# Patient Record
Sex: FEMALE | Race: WHITE | NOT HISPANIC OR LATINO | URBAN - METROPOLITAN AREA
[De-identification: names, ages, dates, MRNs, and addresses within clinical notes are randomized per-mention and may not be internally consistent; named-entity substitution may affect disease eponyms.]

---

## 2018-05-24 ENCOUNTER — TELEPHONE (OUTPATIENT)
Dept: TRANSPLANT | Facility: CLINIC | Age: 23
End: 2018-05-24

## 2018-05-24 NOTE — TELEPHONE ENCOUNTER
"[facility] BREEZE  [id number]      LIVING KIDNEY DONOR EVALUATION  Donor First Name Rosette Donor MRN    Donor Last Name Inocencio Completed 2018 11:15 AM    1995 Record ID [id number]   BREEZE Screen PASSED     Intended Recipient  Recipient First Name Sarah Recipient MRN    Recipient Last Name Inocencio Relationship Parent   Recipient  1965 Recipient Diagnosis    Recipient's ABO      Donor Information  Age 22 Gender Female   Ht 160 cm (5' 3'') Race    Wt 81.6 kg (180 lbs) Ethnicity Not /   BMI 31.90 kg/m  Preferred Language English      Required No     Blood Type O   Demographics  Home Address [address] [place] [address] # [phone]   [facility] Type Mobile   State [place] Alternate #    [facility] Code [address] Type    Country [place] Preferred Contact day Mon, Fri, Thur, Wed, Tue   Email [email or web address] Preferred Contact time 11:00 AM-1:00 PM   Donor's Medical Information  Medical History To Prevent Pregnancy (Birth Control) Medications TriNessa   Surgical History Oral Surgery, NOS Allergies NKDA   Social History EtOH: Rare (1-2 drinks/month)   Illicit Drug Use: Denies   Tobacco: Denies Self-Reported Functional Status \"I am able to participate in strenuous sports such as swimming, singles tennis, football, basketball, or skiing\"   Family Medical History Cancer (denies)   Diabetes (denies)   Heart Disease (denies)   Hypertension (denies)   Kidney Disease (Aunt or Uncle, Mother)   Kidney Stones (denies) Exercise Frequency Exercise (2 X per week)   Review of Organ Systems  Review of Systems Airway or Lungs: No   Blood Disorder: No   Cancer: No   Diabetes,Thyroid,Adrenal,Endocrine Disorder: No   Digestive or Liver: No   Female Health: No   Heart or Circulatory System: No   Immune Diseases: No   Kidneys and Bladder: No   Muscles,Bones,Joints: No   Neuro: No   Psych: No   Donor's Social Information  Marital Status Single Living Accommodation Owns own " home/apartment   Level of Education College or baccalaureate degree complete Living Arrangement With spouse   Employment Status Full Time Concerns: health and life insurance No   Employer [employer] Concerns: job security and lost income No   Occupation      Medical Insurance Status Has medical insurance     High Risk Behavior  High Risk Behaviors Blood transfusion < 12 months. (NO)   Commercial sex < 12 months. (NO)   Illicit IV drug use < 5yrs. (NO)   Other high risk sexual contact < 12 months. (NO)   Reason for Donation  Referral Self Researched Reason for Donation My mother needs this kidney   Permission to Disclose Inquiry Yes Patient Comments    Donor Motivation Level Highly motivated donor     PCP Contact  PCP Name    PCP [facility]    PCP State    PCP Phone    Emergency Contact  First Name Nima First Name Sarah   Last Name Esvin Last Name Inocencio   Phone # [phone] Phone # [phone]   Phone Type Mobile Phone Type Mobile   Relationship Spouse Relationship Mother   Office Use  Reviewed By Omayra Enamorado   Reviewed 5/21/2018 8:55 AM   Admin Folder Accept   Comments 5/21 - Passed Eval   Lost for Followup    Extended Comments    [facility]REEZE ID [email or web address]:[id number] survey status completed   Activity History  Call  Task    Due Date 5/21/2018   Last Modified Date/Time 5/21/2018 2:42 PM   Comments    Call  Task    Due Date 5/21/2018   Last Modified Date/Time 5/21/2018 11:37 AM   Comments

## 2018-05-25 DIAGNOSIS — Z00.5 TRANSPLANT DONOR EVALUATION: Primary | ICD-10-CM

## 2018-05-25 NOTE — TELEPHONE ENCOUNTER
Is abo O. Interested in PEP. Had spoken a couple yrs ago with Nephrologist to see her risks for donating d/t Mom/recip's dx and still wants to test.Will send Phase 1 orders/pkt.